# Patient Record
Sex: MALE | Race: WHITE | Employment: FULL TIME | URBAN - METROPOLITAN AREA
[De-identification: names, ages, dates, MRNs, and addresses within clinical notes are randomized per-mention and may not be internally consistent; named-entity substitution may affect disease eponyms.]

---

## 2024-01-10 ENCOUNTER — APPOINTMENT (OUTPATIENT)
Dept: GENERAL RADIOLOGY | Facility: HOSPITAL | Age: 39
End: 2024-01-10
Attending: EMERGENCY MEDICINE
Payer: COMMERCIAL

## 2024-01-10 ENCOUNTER — HOSPITAL ENCOUNTER (EMERGENCY)
Facility: HOSPITAL | Age: 39
Discharge: HOME | End: 2024-01-10
Attending: EMERGENCY MEDICINE
Payer: COMMERCIAL

## 2024-01-10 VITALS
RESPIRATION RATE: 13 BRPM | BODY MASS INDEX: 24.33 KG/M2 | SYSTOLIC BLOOD PRESSURE: 111 MMHG | HEART RATE: 72 BPM | OXYGEN SATURATION: 99 % | WEIGHT: 155 LBS | DIASTOLIC BLOOD PRESSURE: 74 MMHG | HEIGHT: 66.93 IN | TEMPERATURE: 98 F

## 2024-01-10 DIAGNOSIS — S43.004A DISLOCATION OF RIGHT SHOULDER JOINT, INITIAL ENCOUNTER: Primary | ICD-10-CM

## 2024-01-10 PROCEDURE — 73030 X-RAY EXAM OF SHOULDER: CPT | Performed by: EMERGENCY MEDICINE

## 2024-01-10 PROCEDURE — 23650 CLTX SHO DSLC W/MNPJ WO ANES: CPT

## 2024-01-10 PROCEDURE — 99285 EMERGENCY DEPT VISIT HI MDM: CPT

## 2024-01-10 PROCEDURE — 96374 THER/PROPH/DIAG INJ IV PUSH: CPT

## 2024-01-10 PROCEDURE — 96375 TX/PRO/DX INJ NEW DRUG ADDON: CPT

## 2024-01-10 RX ORDER — IBUPROFEN 600 MG/1
600 TABLET ORAL EVERY 8 HOURS PRN
Qty: 30 TABLET | Refills: 0 | Status: SHIPPED | OUTPATIENT
Start: 2024-01-10

## 2024-01-10 RX ORDER — KETAMINE HYDROCHLORIDE 50 MG/ML
0.5 INJECTION, SOLUTION INTRAMUSCULAR; INTRAVENOUS ONCE
Status: COMPLETED | OUTPATIENT
Start: 2024-01-10 | End: 2024-01-10

## 2024-01-10 NOTE — ED QUICK NOTES
Discharge instructions reviewed with pt. Pt denies any further questions at this time. Pt stable at time of discharge.

## 2024-01-10 NOTE — ED PROVIDER NOTES
Patient Seen in: [facility] Emergency Department      History     Chief Complaint   Patient presents with    Shoulder Injury     Stated Complaint: right shoulder dislocation    Subjective:   HPI    38-year-old male presents for evaluation of right shoulder pain.  Patient was doing overhead pulls at the gym, hyperextended his arms and had sudden onset discomfort and deformity in his right shoulder.  Unable to move the right arm due to pain at the shoulder.  No falls.  Denies weakness or numbness.  No other injuries.    Objective:   History reviewed. No pertinent past medical history.           History reviewed. No pertinent surgical history.             Social History     Socioeconomic History    Marital status:    Tobacco Use    Smoking status: Never    Smokeless tobacco: Never   Substance and Sexual Activity    Alcohol use: Not Currently    Drug use: Not Currently              Review of Systems    Positive for stated complaint: right shoulder dislocation  Other systems are as noted in HPI.  Constitutional and vital signs reviewed.      All other systems reviewed and negative except as noted above.    Physical Exam     ED Triage Vitals   BP 01/10/24 0746 105/56   Pulse 01/10/24 0745 69   Resp 01/10/24 0745 (!) 30   Temp 01/10/24 0809 97.5 °F (36.4 °C)   Temp src 01/10/24 0809 Temporal   SpO2 01/10/24 0745 100 %   O2 Device 01/10/24 0745 None (Room air)       Current:/79   Pulse 74   Temp 97.5 °F (36.4 °C) (Temporal)   Resp 25   Ht 170 cm (5' 6.93\")   Wt 70.3 kg   SpO2 100%   BMI 24.33 kg/m²         Physical Exam  Vitals and nursing note reviewed.   Constitutional:       General: He is not in acute distress.     Appearance: Normal appearance. He is not toxic-appearing.   HENT:      Head: Normocephalic and atraumatic.   Eyes:      Conjunctiva/sclera: Conjunctivae normal.   Cardiovascular:      Rate and Rhythm: Normal rate and regular rhythm.      Pulses:           Radial pulses are 2+ on  the right side and 2+ on the left side.   Pulmonary:      Effort: Pulmonary effort is normal. No respiratory distress.   Musculoskeletal:         General: Deformity present.      Cervical back: Normal range of motion and neck supple. No rigidity.      Comments: Unable to range right arm due to pain, deformity seen in the right shoulder   Neurological:      General: No focal deficit present.      Mental Status: He is alert.   Psychiatric:         Mood and Affect: Mood normal.               ED Course   Labs Reviewed - No data to display          XR SHOULDER, COMPLETE (MIN 2 VIEWS), RIGHT (CPT=73030)    Result Date: 1/10/2024  CONCLUSION:   Interval reduction of the right glenohumeral joint.  The previously described possible Hill-Sachs deformity is not well demonstrated on this study secondary to patient positioning and only two views being obtained.     Dictated by (CST): Lazaro Abarca MD on 1/10/2024 at 10:15 AM     Finalized by (CST): Lazaro Abarca MD on 1/10/2024 at 10:18 AM          XR SHOULDER, COMPLETE (MIN 2 VIEWS), RIGHT (CPT=73030)    Result Date: 1/10/2024  CONCLUSION:  1. Anterior subcoracoid humeral head dislocation. 2. Possible Hill-Sachs impaction deformity posterior humeral head.    Dictated by (CST): Sergio Ham MD on 1/10/2024 at 8:42 AM     Finalized by (CST): Sergio Ham MD on 1/10/2024 at 8:44 AM           The patient required sedation for closed reduction.  The risks, benefits, and alternatives were discussed with the patient and/or the family who consented.  The patient had a screening history (including review of previous problems with anesthesia and history of heavy snoring or sleep apnea)  and exam completed and there are no contraindications to sedation.  ASA designation is ASA 1 - Normal health patient.  Mallampati score: II (hard and soft palate, upper portion of tonsils anduvula visible).  The patient was placed on monitors and was under constant nursing observation.   Under my direct supervision the patient was given ketamine and propofol.  An appropriate level of sedation was achieved.  The patient remained hemodynamically stable with normal oxygen saturations during the procedure.  There were no complications related to the sedation.  Patient was observed until mental status returned to baseline.  Patient was subsequently deemed appropriate for discharge home with responsible caretaker.  The sedation lasted 10 minutes during which I was present.           MDM        Medical Decision Making  Differential diagnosis includes but is not limited to fracture, dislocation, sprain, muscle spasm    Patient with normal neurovascular exam, multiple attempts at this reduction at the bedside without sedation unsuccessful, patient sedated with ketamine and propofol without complication, shoulder joint reduced discharged with orthopedic surgery follow-up, supportive care, return precautions.    Problems Addressed:  Dislocation of right shoulder joint, initial encounter: acute illness or injury    Amount and/or Complexity of Data Reviewed  Radiology: ordered and independent interpretation performed.     Details: Initial x-ray image reviewed, right shoulder dislocation noted    Risk  Prescription drug management.  Parenteral controlled substances.        Disposition and Plan     Clinical Impression:  1. Dislocation of right shoulder joint, initial encounter         Disposition:  Discharge  1/10/2024 11:17 am    Follow-up:  [facility] - [place] [id number] [address] [id number]  [phone]  Schedule an appointment as soon as possible for a visit in 1 week(s)  For follow up          Medications Prescribed:  Current Discharge Medication List        START taking these medications    Details   ibuprofen 600 MG Oral Tab Take 1 tablet (600 mg total) by mouth every 8 (eight) hours as needed for Pain or Fever.  Qty: 30 tablet, Refills: 0

## 2024-01-10 NOTE — DISCHARGE INSTRUCTIONS
Take ibuprofen 600 mg every 8 hours for the next 2 to 3 days and then as needed for pain thereafter.    Ice your shoulder for 15 to 20-minute several times throughout the day to help with pain.    Keep your arm close to your body until seen by Ortho.    See orthopedic surgery for follow-up in the next week.    =================================    [place] ibuprofeno 600 mg cada 8 horas nancy los próximos 2 a 3 días y luego, según sea necesario, para el dolor a partir de entonces.    Aplique hielo en el hombro nancy 15 a 20 minutos varias veces nancy el día para aliviar el dolor.    Mantenga el brazo cerca de chavez cuerpo hasta que lo filipe Ortho.    Consulte cirugía ortopédica para un seguimiento en la próxima semana.

## 2024-01-10 NOTE — ED INITIAL ASSESSMENT (HPI)
Pt presents to ED via EMS for a right shoulder dislocation after lifting weights at the gym. Pt arrives in a sling. Pt in obvious pain. EMS gave a total of 100mcg of fentanyl en route.

## (undated) NOTE — LETTER
Date & Time: 1/10/2024, 11:43 AM  Patient: Devon Bae  Encounter Provider(s):    Leidy Gómez MD       To Whom It May Concern:    Devon Bae was seen and treated in our department on 1/10/2024. He can return to work with these limitations: No lifting anything with his right arm until cleared by ortho .    If you have any questions or concerns, please do not hesitate to call.        _____________________________  Physician/APC Signature